# Patient Record
Sex: MALE | Race: OTHER | NOT HISPANIC OR LATINO | URBAN - METROPOLITAN AREA
[De-identification: names, ages, dates, MRNs, and addresses within clinical notes are randomized per-mention and may not be internally consistent; named-entity substitution may affect disease eponyms.]

---

## 2021-08-04 ENCOUNTER — EMERGENCY (EMERGENCY)
Facility: HOSPITAL | Age: 61
LOS: 0 days | Discharge: HOME | End: 2021-08-05
Attending: EMERGENCY MEDICINE | Admitting: EMERGENCY MEDICINE
Payer: SELF-PAY

## 2021-08-04 VITALS
OXYGEN SATURATION: 98 % | SYSTOLIC BLOOD PRESSURE: 184 MMHG | WEIGHT: 175.05 LBS | TEMPERATURE: 98 F | DIASTOLIC BLOOD PRESSURE: 85 MMHG | RESPIRATION RATE: 17 BRPM | HEART RATE: 64 BPM

## 2021-08-04 DIAGNOSIS — F17.200 NICOTINE DEPENDENCE, UNSPECIFIED, UNCOMPLICATED: ICD-10-CM

## 2021-08-04 DIAGNOSIS — M54.5 LOW BACK PAIN: ICD-10-CM

## 2021-08-04 DIAGNOSIS — G89.29 OTHER CHRONIC PAIN: ICD-10-CM

## 2021-08-04 DIAGNOSIS — R20.0 ANESTHESIA OF SKIN: ICD-10-CM

## 2021-08-04 PROCEDURE — 99053 MED SERV 10PM-8AM 24 HR FAC: CPT

## 2021-08-04 PROCEDURE — 99284 EMERGENCY DEPT VISIT MOD MDM: CPT

## 2021-08-04 NOTE — ED ADULT TRIAGE NOTE - CHIEF COMPLAINT QUOTE
Pt complaining of " sciatica" pain for 2 weeks. Pt was seen in [facility] in  for it and was discharge. Pt was seen by a chiropractor on Monday. Pt complaining of left sided low back pain.

## 2021-08-05 VITALS
TEMPERATURE: 98 F | SYSTOLIC BLOOD PRESSURE: 181 MMHG | HEART RATE: 54 BPM | OXYGEN SATURATION: 96 % | RESPIRATION RATE: 18 BRPM | DIASTOLIC BLOOD PRESSURE: 88 MMHG

## 2021-08-05 RX ORDER — METHOCARBAMOL 500 MG/1
2 TABLET, FILM COATED ORAL
Qty: 24 | Refills: 0
Start: 2021-08-05 | End: 2021-08-07

## 2021-08-05 RX ORDER — METHOCARBAMOL 500 MG/1
1500 TABLET, FILM COATED ORAL ONCE
Refills: 0 | Status: COMPLETED | OUTPATIENT
Start: 2021-08-05 | End: 2021-08-05

## 2021-08-05 RX ORDER — DEXAMETHASONE 0.5 MG/5ML
10 ELIXIR ORAL ONCE
Refills: 0 | Status: COMPLETED | OUTPATIENT
Start: 2021-08-05 | End: 2021-08-05

## 2021-08-05 RX ORDER — GABAPENTIN 400 MG/1
1 CAPSULE ORAL
Qty: 21 | Refills: 0
Start: 2021-08-05 | End: 2021-08-11

## 2021-08-05 RX ORDER — ACETAMINOPHEN 500 MG
650 TABLET ORAL ONCE
Refills: 0 | Status: COMPLETED | OUTPATIENT
Start: 2021-08-05 | End: 2021-08-05

## 2021-08-05 RX ORDER — OXYCODONE HYDROCHLORIDE 5 MG/1
5 TABLET ORAL ONCE
Refills: 0 | Status: DISCONTINUED | OUTPATIENT
Start: 2021-08-05 | End: 2021-08-05

## 2021-08-05 RX ORDER — KETOROLAC TROMETHAMINE 30 MG/ML
30 SYRINGE (ML) INJECTION ONCE
Refills: 0 | Status: DISCONTINUED | OUTPATIENT
Start: 2021-08-05 | End: 2021-08-05

## 2021-08-05 RX ADMIN — Medication 30 MILLIGRAM(S): at 01:11

## 2021-08-05 RX ADMIN — OXYCODONE HYDROCHLORIDE 5 MILLIGRAM(S): 5 TABLET ORAL at 01:53

## 2021-08-05 RX ADMIN — METHOCARBAMOL 1500 MILLIGRAM(S): 500 TABLET, FILM COATED ORAL at 01:53

## 2021-08-05 RX ADMIN — Medication 650 MILLIGRAM(S): at 01:11

## 2021-08-05 RX ADMIN — Medication 10 MILLIGRAM(S): at 01:53

## 2021-08-05 NOTE — ED PROVIDER NOTE - PHYSICAL EXAMINATION
CONSTITUTIONAL: Well-developed; well-nourished; in no acute distress  SKIN: Warm, dry  HEAD: Normocephalic; atraumatic  NECK: Supple; non tender  EXT: Normal ROM.  No clubbing or cyanosis.  No edema  BACK: no midline tenderness, + L paraspinal TTP, sensation in tact, DP pulse 2+  NEURO: A&O x3, grossly unremarkable, no focal deficits, sensation in tact; CN 2-12 grossly intact. +5/5 strength and sensation wnl in all extremities. No ataxia, normal gait  PSYCH: Cooperative, appropriate

## 2021-08-05 NOTE — ED PROVIDER NOTE - NS ED ROS FT
Review of Systems:  CONSTITUTIONAL: No fever, No diaphoresis  SKIN: No rash  HEMATOLOGIC: No abnormal bleeding or bruising  RESPIRATORY: No shortness of breath, No cough  CARDIAC: No chest pain, No palpitations  GI: No abdominal pain, No nausea, No vomiting, No diarrhea  : No dysuria, frequency, hematuria, incontinence  MUSCULOSKELETAL: No joint paint, No swelling, + back pain  NEUROLOGIC: + tingling, No focal weakness, No headache, No dizziness  All other systems negative, unless specified in HPI

## 2021-08-05 NOTE — ED PROVIDER NOTE - ATTENDING CONTRIBUTION TO CARE
61M pmh chronic intermitt LBP s/p lumbar decompression surg by NeuroSx @ [facility] ~20y prior p/w acute on chronic LBP x 2 wks. Denies new falls or trauma. Described as L LBP, aching, intermitt, non-radiating, worse w/mvmt, no assox sx. No f/c, saddle anesthesia, b/b incontinence, focal numbness or weakness. Denies IVDA. Seen @ [facility] in [place] 2x recently for sx, on 7/13 was d/c to home with gabapentin & oxycodone w/Pain Mgmt & Rehab referrals, then seen again on 7/25 with CT L spine showing post-surgical decompression of L4/5, spinal stenosis, no acute findings, was d/c to home with Rx for diclofenac, lidocaine patches & robaxin. States he did not take any of his Rx meds today, and is running out of gabapentin & robaxin. States he had a Pain Mgmt appt in [facility] for 8/3 which he cancelled to see a chiropracter last Fri and this Mon, was given steroid taper on Fri which he completed, w/min improvement.    PE:  nad  skin warm, dry  ncat  neck supple  rrr nl s1s2 no mrg  ctab no wrr  abd soft ntnd no palpable masses no rgr  back +paralumbar TTP no midline TTP no cvat  ext no cce dpi  neuro aaox3 5/5 strength x 4 DTRs intact sensation intact gait nl grossly nf exam

## 2021-08-05 NOTE — ED PROVIDER NOTE - OBJECTIVE STATEMENT
60 yo M with no sig PMH who presents with L sided lower back pain radiating down LLE x2 weeks.  Pt went to [facility] x2, treated with tylenol, oxycodone, Toradol, gabapentin, dilaudid with minimal relief.  Pt having difficulty sleeping so came to ER.  Saw chiropractor with minimal relief.  Has not does Physical Therapy.  Pt endorses mild tingling in LLE.  pt denies fevers, chills, weakness, rash, new back pain, incontinence.

## 2021-08-05 NOTE — ED PROVIDER NOTE - NSFOLLOWUPCLINICS_GEN_ALL_ED_FT
Neurosurgery at [facility]  Neurosurgery  [address] [place] [address], [address]  [place] [address]  Phone: [phone]  Fax:     [facility] ([facility])  [facility]  [facility] 2nd flr, [address] [place] [address]  Phone: [phone]  Fax:

## 2021-08-05 NOTE — ED PROVIDER NOTE - CLINICAL SUMMARY MEDICAL DECISION MAKING FREE TEXT BOX
acute on chronic LBP, recent op CT L spine from 7/28 no acute findings (results reviewed by me/MD Farmer on pt's phone) - neuro exam nf - analgesia/decadron in ED, rec continued use of all previously prescribed meds from [facility] Karolyn Oconnor, Rx for gabapentin & robaxin renewed per pt request, strict return precautions discussed, op Rehab/NeuroSx/Pain Mgmt f/u

## 2021-08-05 NOTE — ED PROVIDER NOTE - PATIENT PORTAL LINK FT
You can access the FollowMyHealth Patient Portal offered by [facility] by registering at the following website: [email or web address]. By joining [employer]’s FollowMyHealth portal, you will also be able to view your health information using other applications (apps) compatible with our system.

## 2021-08-05 NOTE — ED PROVIDER NOTE - NSFOLLOWUPINSTRUCTIONS_ED_ALL_ED_FT
Back Pain    Back pain is very common in adults. The cause of back pain is rarely dangerous and the pain often gets better over time. The cause of your back pain may not be known and may include strain of muscles or ligaments, degeneration of the spinal disks, or arthritis. Occasionally the pain may radiate down your leg(s). Over-the-counter medicines to reduce pain and inflammation are often the most helpful. Stretching and remaining active frequently helps the healing process.     SEEK IMMEDIATE MEDICAL CARE IF YOU HAVE THE FOLLOWING SYMPTOMS: bowel or bladder control problems, unusual weakness or numbness in your arms or legs, nausea or vomiting, abdominal pain, fever, dizziness/lightheadedness.     Chronic Pain    Chronic pain can be defined as pain that is off and on and lasts for 3–6 months or longer. Many things cause chronic pain, which can make it difficult to make a diagnosis. There are many treatment options available for chronic pain. However, finding a treatment that works well for you may require trying various approaches until the right one is found. Many people benefit from a combination of two or more types of treatment to control their pain.    SYMPTOMS  Chronic pain can occur anywhere in the body and can range from mild to very severe. Some types of chronic pain include:    Ø	Headache.  Ø	Low back pain.  Ø	Cancer pain.  Ø	Arthritis pain.  Ø	Neurogenic pain. This is pain resulting from damage to nerves.     People with chronic pain may also have other symptoms such as:    Ø	Depression.  Ø	Anger.  Ø	Insomnia.  Ø	Anxiety.    DIAGNOSIS  Your health care provider will help diagnose your condition over time. In many cases, the initial focus will be on excluding possible conditions that could be causing the pain. Depending on your symptoms, your health care provider may order tests to diagnose your condition. Some of these tests may include:     Ø	Blood tests.    Ø	CT scan.    Ø	MRI.    Ø	X-rays.    Ø	Ultrasounds.    Ø	Nerve conduction studies.      You may need to see a specialist.     TREATMENT  Finding treatment that works well may take time. You may be referred to a pain specialist. He or she may prescribe medicine or therapies, such as:     Ø	Mindful meditation or yoga.  Ø	Shots (injections) of numbing or pain-relieving medicines into the spine or area of pain.  Ø	Local electrical stimulation.  Ø	Acupuncture.    Ø	Massage therapy.    Ø	Aroma, color, light, or sound therapy.    Ø	Biofeedback.    Ø	Working with a physical therapist to keep from getting stiff.    Ø	Regular, gentle exercise.    Ø	Cognitive or behavioral therapy.    Ø	Group support.      Sometimes, surgery may be recommended.     HOME CARE INSTRUCTIONS  Ø	Take all medicines as directed by your health care provider.    Ø	Lessen stress in your life by relaxing and doing things such as listening to calming music.    Ø	Exercise or be active as directed by your health care provider.    Ø	Eat a healthy diet and include things such as vegetables, fruits, fish, and lean meats in your diet.    Ø	Keep all follow-up appointments with your health care provider.    Ø	Attend a support group with others suffering from chronic pain.     SEEK MEDICAL CARE IF:  Ø	Your pain gets worse.    Ø	You develop a new pain that was not there before.    Ø	You cannot tolerate medicines given to you by your health care provider.    Ø	You have new symptoms since your last visit with your health care provider.      SEEK IMMEDIATE MEDICAL CARE IF:  Ø	You feel weak.    Ø	You have decreased sensation or numbness.    Ø	You lose control of bowel or bladder function.    Ø	Your pain suddenly gets much worse.    Ø	You develop shaking.  Ø	You develop chills.   Ø	You develop confusion.  Ø	You develop chest pain.  Ø	You develop shortness of breath.      MAKE SURE YOU:  Ø	Understand these instructions.  Ø	Will watch your condition.  Ø	Will get help right away if you are not doing well or get worse.     ADDITIONAL NOTES AND INSTRUCTIONS    Please follow up with your Primary MD in 24-48 hr.  Seek immediate medical care for any new/worsening signs or symptoms.

## 2021-08-05 NOTE — ED ADULT NURSE NOTE - CHIEF COMPLAINT QUOTE
Pt complaining of " sciatica" pain for 2 weeks. Pt was seen in [facility] in  for it and was discharge. Pt was seen by a chiropractor on Monday. Pt complaining of left sided low back pain.

## 2021-08-05 NOTE — ED PROVIDER NOTE - CARE PROVIDER_API CALL
Steven Jensen (MD)  Anesthesiology; Pain Medicine  [address] [place] [address]  Phone: [phone]  Fax: [phone]  Follow Up Time: 7-10 Days

## 2021-08-12 ENCOUNTER — EMERGENCY (EMERGENCY)
Facility: HOSPITAL | Age: 61
LOS: 0 days | Discharge: HOME | End: 2021-08-12
Attending: STUDENT IN AN ORGANIZED HEALTH CARE EDUCATION/TRAINING PROGRAM | Admitting: STUDENT IN AN ORGANIZED HEALTH CARE EDUCATION/TRAINING PROGRAM
Payer: COMMERCIAL

## 2021-08-12 VITALS
DIASTOLIC BLOOD PRESSURE: 85 MMHG | HEART RATE: 87 BPM | OXYGEN SATURATION: 98 % | WEIGHT: 169.98 LBS | TEMPERATURE: 98 F | RESPIRATION RATE: 18 BRPM | SYSTOLIC BLOOD PRESSURE: 141 MMHG

## 2021-08-12 DIAGNOSIS — M54.5 LOW BACK PAIN: ICD-10-CM

## 2021-08-12 DIAGNOSIS — Z87.39 PERSONAL HISTORY OF OTHER DISEASES OF THE MUSCULOSKELETAL SYSTEM AND CONNECTIVE TISSUE: ICD-10-CM

## 2021-08-12 DIAGNOSIS — Z79.899 OTHER LONG TERM (CURRENT) DRUG THERAPY: ICD-10-CM

## 2021-08-12 PROCEDURE — 99284 EMERGENCY DEPT VISIT MOD MDM: CPT

## 2021-08-12 RX ORDER — DEXAMETHASONE 0.5 MG/5ML
10 ELIXIR ORAL ONCE
Refills: 0 | Status: COMPLETED | OUTPATIENT
Start: 2021-08-12 | End: 2021-08-12

## 2021-08-12 RX ORDER — CYCLOBENZAPRINE HYDROCHLORIDE 10 MG/1
5 TABLET, FILM COATED ORAL ONCE
Refills: 0 | Status: COMPLETED | OUTPATIENT
Start: 2021-08-12 | End: 2021-08-12

## 2021-08-12 RX ORDER — KETOROLAC TROMETHAMINE 30 MG/ML
30 SYRINGE (ML) INJECTION ONCE
Refills: 0 | Status: DISCONTINUED | OUTPATIENT
Start: 2021-08-12 | End: 2021-08-12

## 2021-08-12 RX ORDER — LIDOCAINE 4 G/100G
1 CREAM TOPICAL ONCE
Refills: 0 | Status: COMPLETED | OUTPATIENT
Start: 2021-08-12 | End: 2021-08-12

## 2021-08-12 RX ORDER — CYCLOBENZAPRINE HYDROCHLORIDE 10 MG/1
1 TABLET, FILM COATED ORAL
Qty: 7 | Refills: 0
Start: 2021-08-12 | End: 2021-08-18

## 2021-08-12 RX ORDER — ACETAMINOPHEN 500 MG
2 TABLET ORAL
Qty: 84 | Refills: 0
Start: 2021-08-12 | End: 2021-08-18

## 2021-08-12 RX ORDER — ACETAMINOPHEN 500 MG
975 TABLET ORAL ONCE
Refills: 0 | Status: COMPLETED | OUTPATIENT
Start: 2021-08-12 | End: 2021-08-12

## 2021-08-12 RX ORDER — LIDOCAINE 4 G/100G
1 CREAM TOPICAL
Qty: 7 | Refills: 0
Start: 2021-08-12 | End: 2021-08-18

## 2021-08-12 RX ADMIN — CYCLOBENZAPRINE HYDROCHLORIDE 5 MILLIGRAM(S): 10 TABLET, FILM COATED ORAL at 14:16

## 2021-08-12 RX ADMIN — Medication 975 MILLIGRAM(S): at 14:01

## 2021-08-12 RX ADMIN — Medication 10 MILLIGRAM(S): at 14:01

## 2021-08-12 RX ADMIN — LIDOCAINE 1 PATCH: 4 CREAM TOPICAL at 14:16

## 2021-08-12 RX ADMIN — Medication 30 MILLIGRAM(S): at 14:01

## 2021-08-12 NOTE — ED PROVIDER NOTE - CLINICAL SUMMARY MEDICAL DECISION MAKING FREE TEXT BOX
60 y/o male with no PMH who presents to ED for back pain. Pt c/o one month hx of pain in the left buttock radiating down the left leg, worse with movement that began after playing golf. Pt was seen in the ED x2 (initially at [facility] and the [facility]) and scheduled a follow up with a spine specialist and a chiropractor. Pt had outpt CT done that was reportedly WNL. Pt has since, been taking gabapentin only at home with continued pain, prompting ED visit. No weakness/numbness/fevers. No hx of IVDU. Plan: Pain control and outpt f/u.

## 2021-08-12 NOTE — ED ADULT NURSE NOTE - CHIEF COMPLAINT QUOTE
left sided back pain radiating down left leg x 3 weeks, dx with sciatica but now having pain more in left hip Clothing

## 2021-08-12 NOTE — ED PROVIDER NOTE - CARE PROVIDER_API CALL
Lee Hoawrd)  Neuromuscular Medicine  [address] [place] [address], [address]  [place], [place] [id number]  Phone: [phone]  Fax: [phone]  Follow Up Time:

## 2021-08-12 NOTE — ED PROVIDER NOTE - ATTENDING CONTRIBUTION TO CARE
60 y/o male with no PMH who presents to ED for back pain. Pt c/o one month hx of pain in the left buttock radiating down the left leg, worse with movement that began after playing golf. Pt was seen in the ED x2 (initially at [facility] and the [facility]) and scheduled a follow up with a spine specialist and a chiropractor. Pt had outpt CT done that was reportedly WNL. Pt has since, been taking gabapentin only at home with continued pain, prompting ED visit. No weakness/numbness/fevers. No hx of IVDU. Exam: CONSTITUTIONAL: Well-developed; well-nourished; in no acute distress.   SKIN: warm, dry  HEAD: Normocephalic; atraumatic.  EYES: no conjunctival injection. PERRL.   ENT: No nasal discharge; airway clear.  NECK: Supple; non tender.  BACK: + ttp over the left buttock, + SLR on the right   CARD: S1, S2 normal; no murmurs, gallops, or rubs. Regular rate and rhythm.   RESP: No wheezes, rales or rhonchi.  ABD: soft ntnd  EXT: Normal ROM.  No clubbing, cyanosis or edema.   NEURO: Alert, oriented, grossly unremarkable  PSYCH: Cooperative, appropriate.    Plan: Pain control and outpt f/u.

## 2021-08-12 NOTE — ED PROVIDER NOTE - NS ED ROS FT
Constitutional: (-) fever  Eyes/ENT: (-) blurry vision, (-) epistaxis  Cardiovascular: (-) chest pain, (-) syncope  Respiratory: (-) cough, (-) shortness of breath  Gastrointestinal: (-) vomiting, (-) diarrhea  : (-) incontinence, (-) hematuria, (-) dysuria  Musculoskeletal: (+) back pain, (-) neck pain, (-) joint pain  Integumentary: (-) rash, (-) edema  Neurological: (-) headache, (-) altered mental status  Allergic/Immunologic: (-) pruritus

## 2021-08-12 NOTE — ED ADULT TRIAGE NOTE - CHIEF COMPLAINT QUOTE
left sided back pain radiating down left leg x 3 weeks, dx with sciatica but now having pain more in left hip

## 2021-08-12 NOTE — ED PROVIDER NOTE - PROGRESS NOTE DETAILS
re-exam pt feels better neuro (-) focal. Patient to be discharged from ED. Any available test results were discussed with patient and/or family. Verbal instructions given, including instructions to return to ED immediately for any new, worsening, or concerning symptoms. Patient endorsed understanding. Written discharge instructions additionally given, including follow-up plan. Authored by Stacey Martinez DO

## 2021-08-12 NOTE — ED PROVIDER NOTE - PHYSICAL EXAMINATION
CONST: NAD  EYES: Sclera and conjunctiva clear.   ENT: No nasal discharge. Oropharynx normal appearing, no erythema or exudates. No abscess or swelling. Uvula midline.   NECK: Non-tender, no meningeal signs. normal ROM. supple   CARD: S1 S2; No jvd  RESP: Equal BS B/L, No wheezes, rhonchi or rales. No distress  GI: Soft, non-tender, non-distended. normal BS  MS: Reproducible L paraspinal lumbar tenderness. Normal ROM in all extremities. pulses 2 +. no calf tenderness or swelling  SKIN: Warm, dry, no acute rashes. Good turgor  NEURO: A&Ox3, No focal deficits. Strength 5/5 with no sensory deficits. Steady gait.

## 2021-08-12 NOTE — ED PROVIDER NOTE - OBJECTIVE STATEMENT
61y M pmh sciatica presents for eval of L back pain. Pt has moderate shooting L lower back pain radiating down his L leg x1mo after playing golf, aggravated with ambulation, mild improvement with tyelnol, gabapentin, robaxin. Denies numbness, weakness, incontinence

## 2021-08-12 NOTE — ED PROVIDER NOTE - PATIENT PORTAL LINK FT
You can access the FollowMyHealth Patient Portal offered by [facility] by registering at the following website: [email or web address]. By joining [employer]’s FollowMyHealth portal, you will also be able to view your health information using other applications (apps) compatible with our system.

## 2021-08-12 NOTE — ED PROVIDER NOTE - NSFOLLOWUPCLINICS_GEN_ALL_ED_FT
[facility] ([facility])  Rehabilitation  [facility] [address] fl[address], [address] [place] [address]  Phone: [phone]  Fax:

## 2025-02-27 NOTE — ED ADULT TRIAGE NOTE - BP NONINVASIVE DIASTOLIC (MM HG)
Lab Results   Component Value Date    HGBA1C 6.4 (H) 01/14/2025            
  Orders:    Wound cleansing and dressings Malignant  Left;Upper Back; Future    Wound Procedure Treatment Malignant  Left;Upper Back    
85